# Patient Record
Sex: FEMALE | Race: WHITE | NOT HISPANIC OR LATINO | URBAN - METROPOLITAN AREA
[De-identification: names, ages, dates, MRNs, and addresses within clinical notes are randomized per-mention and may not be internally consistent; named-entity substitution may affect disease eponyms.]

---
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
## 2020-07-14 ENCOUNTER — APPOINTMENT (OUTPATIENT)
Dept: ULTRASOUND IMAGING | Facility: CLINIC | Age: 20
End: 2020-07-14

## 2020-09-15 ENCOUNTER — APPOINTMENT (OUTPATIENT)
Dept: NEUROSURGERY | Facility: CLINIC | Age: 20
End: 2020-09-15
Payer: COMMERCIAL

## 2020-09-15 VITALS
BODY MASS INDEX: 21.26 KG/M2 | HEIGHT: 63 IN | DIASTOLIC BLOOD PRESSURE: 65 MMHG | SYSTOLIC BLOOD PRESSURE: 100 MMHG | HEART RATE: 80 BPM | WEIGHT: 120 LBS

## 2020-09-15 DIAGNOSIS — M54.9 DORSALGIA, UNSPECIFIED: ICD-10-CM

## 2020-09-15 DIAGNOSIS — S32.000A WEDGE COMPRESSION FRACTURE OF UNSPECIFIED LUMBAR VERTEBRA, INITIAL ENCOUNTER FOR CLOSED FRACTURE: ICD-10-CM

## 2020-09-15 DIAGNOSIS — M41.9 SCOLIOSIS, UNSPECIFIED: ICD-10-CM

## 2020-09-15 PROCEDURE — 99214 OFFICE O/P EST MOD 30 MIN: CPT

## 2020-09-15 NOTE — PHYSICAL EXAM
[General Appearance - Alert] : alert [General Appearance - In No Acute Distress] : in no acute distress [General Appearance - Well Nourished] : well nourished [General Appearance - Well Developed] : well developed [Impaired Insight] : insight and judgment were intact [General Appearance - Well-Appearing] : healthy appearing [Oriented To Time, Place, And Person] : oriented to person, place, and time [] : normal voice and communication [Memory Recent] : recent memory was not impaired [Affect] : the affect was normal [Mood] : the mood was normal [Place] : oriented to place [Person] : oriented to person [Memory Remote] : remote memory was not impaired [Time] : oriented to time [Cranial Nerves Optic (II)] : visual acuity intact bilaterally,  pupils equal round and reactive to light [Cranial Nerves Oculomotor (III)] : extraocular motion intact [Cranial Nerves Trigeminal (V)] : facial sensation intact symmetrically [Cranial Nerves Facial (VII)] : face symmetrical [Cranial Nerves Glossopharyngeal (IX)] : tongue and palate midline [Cranial Nerves Vestibulocochlear (VIII)] : hearing was intact bilaterally [Cranial Nerves Hypoglossal (XII)] : there was no tongue deviation with protrusion [Cranial Nerves Accessory (XI - Cranial And Spinal)] : head turning and shoulder shrug symmetric [Motor Tone] : muscle tone was normal in all four extremities [Motor Strength] : muscle strength was normal in all four extremities [5] : S1 ankle flexors 5/5 [Balance] : balance was intact [Abnormal Walk] : normal gait [Straight-Leg Raise Test - Left] : straight leg raise of the left leg was negative [Straight-Leg Raise Test - Right] : straight leg raise  of the right leg was negative [Normal] : normal [FreeTextEntry2] : Some swelling noted in the region of the left paraspinal region

## 2020-09-15 NOTE — REASON FOR VISIT
[Follow-Up: _____] : a [unfilled] follow-up visit [Formal Caregiver] : formal caregiver [FreeTextEntry1] : L1 Compression Fx.

## 2020-09-15 NOTE — HISTORY OF PRESENT ILLNESS
[FreeTextEntry1] : 20-year-old female presents to the neurosurgery office with her father for followup evaluation. The patient sustained an L1 compression fracture from a fall from a water trampoline while on land. Conservative treatment was recommended at that time. The patient has no deficit or radicular symptoms. She reports intermittent back discomfort with left-sided muscular pain. The patient is here today for evaluation stating she notes that the left paraspinal musculature is higher than the right paraspinal muscles. She reports that she is still able to participate in full activity as tolerated.

## 2020-09-15 NOTE — ASSESSMENT
[FreeTextEntry1] : Discussed the history, physical examination findings, and today's radiographs with the patient and her father with all questions answered. Discussed that the presence of thoracolumbar scoliosis with the L1 compression deformity may be enough to cause extra stress to be referred to the left paraspinal region. Recommend rest, modify her activity, topical Voltaren gel, and PT as needed. Reassurance provided. Discussed with the patient he wear a lumbar corset for physical activities. She will follow up on an as-needed basis.

## 2020-09-15 NOTE — DATA REVIEWED
[de-identified] : Were reviewed of the lumbar spine - 9/15/2020: Evidence of healed L1 compression fracture deformity at the superior endplate; thoracolumbar scoliotic curve noted

## 2020-10-27 ENCOUNTER — APPOINTMENT (OUTPATIENT)
Dept: MRI IMAGING | Facility: CLINIC | Age: 20
End: 2020-10-27
Payer: COMMERCIAL

## 2020-10-27 PROCEDURE — 99072 ADDL SUPL MATRL&STAF TM PHE: CPT

## 2020-10-27 PROCEDURE — 72146 MRI CHEST SPINE W/O DYE: CPT

## 2021-03-12 ENCOUNTER — APPOINTMENT (OUTPATIENT)
Dept: MRI IMAGING | Facility: CLINIC | Age: 21
End: 2021-03-12
Payer: COMMERCIAL

## 2021-03-12 ENCOUNTER — TRANSCRIPTION ENCOUNTER (OUTPATIENT)
Age: 21
End: 2021-03-12

## 2021-03-12 PROCEDURE — 73221 MRI JOINT UPR EXTREM W/O DYE: CPT | Mod: LT

## 2021-07-26 ENCOUNTER — OUTPATIENT (OUTPATIENT)
Dept: OUTPATIENT SERVICES | Facility: HOSPITAL | Age: 21
LOS: 1 days | End: 2021-07-26

## 2022-02-09 ENCOUNTER — RESULT REVIEW (OUTPATIENT)
Age: 22
End: 2022-02-09

## 2022-04-07 ENCOUNTER — APPOINTMENT (OUTPATIENT)
Dept: ORTHOPEDIC SURGERY | Facility: CLINIC | Age: 22
End: 2022-04-07
Payer: COMMERCIAL

## 2022-04-07 VITALS — HEIGHT: 63 IN | WEIGHT: 115 LBS | BODY MASS INDEX: 20.38 KG/M2

## 2022-04-07 PROCEDURE — 99024 POSTOP FOLLOW-UP VISIT: CPT

## 2022-04-07 NOTE — DISCUSSION/SUMMARY
[de-identified] : The patient is s/p a left shoulder pancapsular plication on 2/9/22.\par \par The patient is doing well postoperatively. \par She will continue with PT and given a new prescription. \par The patient does have restriction in ROM and we will consider adding PROM at her next follow up. \par She will need to see Dr. Saravia at that time.

## 2022-04-07 NOTE — PHYSICAL EXAM
[Normal Coordination] : normal coordination [Normal Sensation] : normal sensation [Normal Mood and Affect] : normal mood and affect [Orientated] : orientated [Able to Communicate] : able to communicate [Normal Skin] : normal skin [Well Developed] : well developed [Well Nourished] : well nourished [Peripheral vascular exam is grossly normal] : peripheral vascular exam is grossly normal [Left] : left shoulder [Standing] : standing [Minimal] : minimal [4 ___] : forward flexion 4[unfilled]/5 [4___] : internal rotation 4[unfilled]/5 [] : no tenderness at lateral shoulder [FreeTextEntry9] : ROM is moderately restricted due to surgery. [TWNoteComboBox7] : active forward flexion 95 degrees [de-identified] : active abduction 80 degrees [TWNoteComboBox6] : internal rotation lateral hip [de-identified] : external rotation 10 degrees

## 2022-04-07 NOTE — HISTORY OF PRESENT ILLNESS
[de-identified] : The patient is s/p 8 weeks from surgery. She is doing well with PT and is slowly improving her ROM. She does still feel tightness and restriction with ROM. She denies new trauma. She has intermittent 5th finger numbness to the left hand. The patient notes this does not bother her and is very intermittent. She denies pain with shoulder ROM. She is able to complete simple ADLs at this time. She has not needed any pain medications. She will continue PT at this time.

## 2022-05-05 ENCOUNTER — APPOINTMENT (OUTPATIENT)
Dept: ORTHOPEDIC SURGERY | Facility: CLINIC | Age: 22
End: 2022-05-05
Payer: COMMERCIAL

## 2022-05-05 VITALS — BODY MASS INDEX: 20.38 KG/M2 | HEIGHT: 63 IN | WEIGHT: 115 LBS

## 2022-05-05 DIAGNOSIS — M25.512 PAIN IN LEFT SHOULDER: ICD-10-CM

## 2022-05-05 DIAGNOSIS — Z78.9 OTHER SPECIFIED HEALTH STATUS: ICD-10-CM

## 2022-05-05 DIAGNOSIS — Z98.890 OTHER SPECIFIED POSTPROCEDURAL STATES: ICD-10-CM

## 2022-05-05 PROCEDURE — 99024 POSTOP FOLLOW-UP VISIT: CPT

## 2022-05-05 NOTE — HISTORY OF PRESENT ILLNESS
[de-identified] : The patient is s/p 3 months from surgery. She is doing well with PT and is slowly improving her ROM. She does still feel tightness and restriction with ROM, states the therapy is still helping improve the range of motion. She denies new trauma. No complaints of numbness or paraesthesias, this has improved since her last visit. She denies pain with shoulder ROM. She is able to complete simple ADLs at this time. She has not needed any pain medications. She will continue PT at this time.

## 2022-05-05 NOTE — PHYSICAL EXAM
[Normal Coordination] : normal coordination [Normal Sensation] : normal sensation [Normal Mood and Affect] : normal mood and affect [Orientated] : orientated [Able to Communicate] : able to communicate [Normal Skin] : normal skin [Well Developed] : well developed [Well Nourished] : well nourished [Peripheral vascular exam is grossly normal] : peripheral vascular exam is grossly normal [Left] : left shoulder [Standing] : standing [Minimal] : minimal [4 ___] : forward flexion 4[unfilled]/5 [4___] : internal rotation 4[unfilled]/5 [No obvious lymphadenopathy in areas examined] : no obvious lymphadenopathy in areas examined [] : no tenderness at lateral shoulder [FreeTextEntry9] : ROM is moderately restricted due to surgery. [TWNoteComboBox7] : active forward flexion 130 degrees [TWNoteComboBox4] : passive forward flexion 100 degrees [de-identified] : active abduction 80 degrees [TWNoteComboBox9] : passive abduction 80 degrees [TWNoteComboBox6] : internal rotation lateral hip [de-identified] : external rotation 10 degrees

## 2022-05-05 NOTE — DISCUSSION/SUMMARY
[de-identified] : The patient is s/p a left shoulder pancapsular plication on 2/9/22.\par \par The patient is doing well postoperatively with some residual stiffness \par She will continue with PT for the left shoulder, and is cleared for PROM and strengthening at this time \par The patient will follow up in 6 weeks \par Patient is cleared for sports activity at this time, but will refrain from contact activity for another 3 months.

## 2022-06-23 ENCOUNTER — APPOINTMENT (OUTPATIENT)
Dept: ORTHOPEDIC SURGERY | Facility: CLINIC | Age: 22
End: 2022-06-23